# Patient Record
Sex: MALE | Race: WHITE | NOT HISPANIC OR LATINO | URBAN - METROPOLITAN AREA
[De-identification: names, ages, dates, MRNs, and addresses within clinical notes are randomized per-mention and may not be internally consistent; named-entity substitution may affect disease eponyms.]

---

## 2020-07-30 ENCOUNTER — IMPORTED ENCOUNTER (OUTPATIENT)
Dept: URBAN - METROPOLITAN AREA CLINIC 50 | Facility: CLINIC | Age: 80
End: 2020-07-30

## 2020-07-31 ENCOUNTER — IMPORTED ENCOUNTER (OUTPATIENT)
Dept: URBAN - METROPOLITAN AREA CLINIC 50 | Facility: CLINIC | Age: 80
End: 2020-07-31

## 2020-07-31 NOTE — PATIENT DISCUSSION
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 Recommend observation at this time.  """

## 2020-08-03 ENCOUNTER — IMPORTED ENCOUNTER (OUTPATIENT)
Dept: URBAN - METROPOLITAN AREA CLINIC 50 | Facility: CLINIC | Age: 80
End: 2020-08-03

## 2020-08-04 ENCOUNTER — IMPORTED ENCOUNTER (OUTPATIENT)
Dept: URBAN - METROPOLITAN AREA CLINIC 50 | Facility: CLINIC | Age: 80
End: 2020-08-04

## 2020-08-28 ENCOUNTER — IMPORTED ENCOUNTER (OUTPATIENT)
Dept: URBAN - METROPOLITAN AREA CLINIC 50 | Facility: CLINIC | Age: 80
End: 2020-08-28

## 2020-09-02 ENCOUNTER — IMPORTED ENCOUNTER (OUTPATIENT)
Dept: URBAN - METROPOLITAN AREA CLINIC 50 | Facility: CLINIC | Age: 80
End: 2020-09-02

## 2020-09-02 NOTE — PATIENT DISCUSSION
"""S/P IOL OS: Sensar AAB00 27.5 +Omidria. Continue post operative instructions and drops per schedule.  """

## 2020-09-11 ENCOUNTER — IMPORTED ENCOUNTER (OUTPATIENT)
Dept: URBAN - METROPOLITAN AREA CLINIC 50 | Facility: CLINIC | Age: 80
End: 2020-09-11

## 2020-09-16 ENCOUNTER — IMPORTED ENCOUNTER (OUTPATIENT)
Dept: URBAN - METROPOLITAN AREA CLINIC 50 | Facility: CLINIC | Age: 80
End: 2020-09-16

## 2020-09-16 NOTE — PATIENT DISCUSSION
"""S/P IOL OD: Sensar AAB00 29.5 +Omidria. Continue post operative instructions and drops per schedule.  """

## 2020-09-24 ENCOUNTER — IMPORTED ENCOUNTER (OUTPATIENT)
Dept: URBAN - METROPOLITAN AREA CLINIC 50 | Facility: CLINIC | Age: 80
End: 2020-09-24

## 2020-09-25 ENCOUNTER — IMPORTED ENCOUNTER (OUTPATIENT)
Dept: URBAN - METROPOLITAN AREA CLINIC 50 | Facility: CLINIC | Age: 80
End: 2020-09-25

## 2020-10-13 ENCOUNTER — IMPORTED ENCOUNTER (OUTPATIENT)
Dept: URBAN - METROPOLITAN AREA CLINIC 50 | Facility: CLINIC | Age: 80
End: 2020-10-13

## 2021-04-17 ASSESSMENT — VISUAL ACUITY
OS_CC: 20/50-
OS_OTHER: <400.
OD_CC: 20/400
OS_CC: J2@ 16 IN
OD_CC: J5
OS_SC: 20/70+2
OS_CC: 20/70
OD_SC: 20/400
OS_CC: 20/50
OD_SC: 20/400
OD_CC: 20/400
OS_SC: 20/70
OS_BAT: <400
OD_CC: J2@ 16 IN
OD_CC: 20/400
OS_CC: 20/200
OS_CC: J5
OD_SC: 20/400

## 2021-04-17 ASSESSMENT — TONOMETRY
OS_IOP_MMHG: 15
OD_IOP_MMHG: 14
OS_IOP_MMHG: 17
OS_IOP_MMHG: 14
OD_IOP_MMHG: 14
OD_IOP_MMHG: 21
OD_IOP_MMHG: 16
OS_IOP_MMHG: 14
OS_IOP_MMHG: 26
OD_IOP_MMHG: 14
OD_IOP_MMHG: 14
OS_IOP_MMHG: 16